# Patient Record
Sex: FEMALE | Race: WHITE | URBAN - METROPOLITAN AREA
[De-identification: names, ages, dates, MRNs, and addresses within clinical notes are randomized per-mention and may not be internally consistent; named-entity substitution may affect disease eponyms.]

---

## 2022-01-24 ENCOUNTER — OFFICE VISIT (OUTPATIENT)
Dept: PRIMARY CARE CLINIC | Age: 27
End: 2022-01-24
Payer: COMMERCIAL

## 2022-01-24 VITALS
BODY MASS INDEX: 44.41 KG/M2 | DIASTOLIC BLOOD PRESSURE: 72 MMHG | SYSTOLIC BLOOD PRESSURE: 118 MMHG | TEMPERATURE: 97.7 F | OXYGEN SATURATION: 98 % | WEIGHT: 293 LBS | HEART RATE: 94 BPM | HEIGHT: 68 IN

## 2022-01-24 DIAGNOSIS — Z13.1 SCREENING FOR DIABETES MELLITUS: ICD-10-CM

## 2022-01-24 DIAGNOSIS — F90.2 ATTENTION DEFICIT HYPERACTIVITY DISORDER (ADHD), COMBINED TYPE: ICD-10-CM

## 2022-01-24 DIAGNOSIS — R73.03 PREDIABETES: ICD-10-CM

## 2022-01-24 DIAGNOSIS — F33.0 MILD EPISODE OF RECURRENT MAJOR DEPRESSIVE DISORDER (HCC): Primary | ICD-10-CM

## 2022-01-24 DIAGNOSIS — R23.3 PETECHIAL RASH: ICD-10-CM

## 2022-01-24 DIAGNOSIS — Z13.220 SCREENING FOR HYPERLIPIDEMIA: ICD-10-CM

## 2022-01-24 DIAGNOSIS — J45.20 MILD INTERMITTENT ASTHMA WITHOUT COMPLICATION: ICD-10-CM

## 2022-01-24 DIAGNOSIS — G47.30 SLEEP APNEA, UNSPECIFIED TYPE: ICD-10-CM

## 2022-01-24 DIAGNOSIS — E66.01 CLASS 3 SEVERE OBESITY DUE TO EXCESS CALORIES WITHOUT SERIOUS COMORBIDITY WITH BODY MASS INDEX (BMI) OF 45.0 TO 49.9 IN ADULT (HCC): ICD-10-CM

## 2022-01-24 PROCEDURE — 99204 OFFICE O/P NEW MOD 45 MIN: CPT | Performed by: FAMILY MEDICINE

## 2022-01-24 RX ORDER — BUPROPION HYDROCHLORIDE 150 MG/1
150 TABLET ORAL EVERY MORNING
Qty: 30 TABLET | Refills: 0 | Status: SHIPPED | OUTPATIENT
Start: 2022-01-24 | End: 2022-02-28 | Stop reason: SDUPTHER

## 2022-01-24 RX ORDER — ACETAMINOPHEN, ASPIRIN AND CAFFEINE 250; 250; 65 MG/1; MG/1; MG/1
1 TABLET, FILM COATED ORAL EVERY 6 HOURS PRN
COMMUNITY

## 2022-01-24 SDOH — ECONOMIC STABILITY: FOOD INSECURITY: WITHIN THE PAST 12 MONTHS, YOU WORRIED THAT YOUR FOOD WOULD RUN OUT BEFORE YOU GOT MONEY TO BUY MORE.: NEVER TRUE

## 2022-01-24 SDOH — ECONOMIC STABILITY: FOOD INSECURITY: WITHIN THE PAST 12 MONTHS, THE FOOD YOU BOUGHT JUST DIDN'T LAST AND YOU DIDN'T HAVE MONEY TO GET MORE.: NEVER TRUE

## 2022-01-24 ASSESSMENT — PATIENT HEALTH QUESTIONNAIRE - PHQ9
4. FEELING TIRED OR HAVING LITTLE ENERGY: 2
7. TROUBLE CONCENTRATING ON THINGS, SUCH AS READING THE NEWSPAPER OR WATCHING TELEVISION: 2
1. LITTLE INTEREST OR PLEASURE IN DOING THINGS: 1
SUM OF ALL RESPONSES TO PHQ QUESTIONS 1-9: 9
2. FEELING DOWN, DEPRESSED OR HOPELESS: 1
8. MOVING OR SPEAKING SO SLOWLY THAT OTHER PEOPLE COULD HAVE NOTICED. OR THE OPPOSITE, BEING SO FIGETY OR RESTLESS THAT YOU HAVE BEEN MOVING AROUND A LOT MORE THAN USUAL: 1
SUM OF ALL RESPONSES TO PHQ QUESTIONS 1-9: 9
10. IF YOU CHECKED OFF ANY PROBLEMS, HOW DIFFICULT HAVE THESE PROBLEMS MADE IT FOR YOU TO DO YOUR WORK, TAKE CARE OF THINGS AT HOME, OR GET ALONG WITH OTHER PEOPLE: 1
9. THOUGHTS THAT YOU WOULD BE BETTER OFF DEAD, OR OF HURTING YOURSELF: 0
3. TROUBLE FALLING OR STAYING ASLEEP: 1
SUM OF ALL RESPONSES TO PHQ QUESTIONS 1-9: 9
SUM OF ALL RESPONSES TO PHQ QUESTIONS 1-9: 9
6. FEELING BAD ABOUT YOURSELF - OR THAT YOU ARE A FAILURE OR HAVE LET YOURSELF OR YOUR FAMILY DOWN: 0
5. POOR APPETITE OR OVEREATING: 1
SUM OF ALL RESPONSES TO PHQ9 QUESTIONS 1 & 2: 2

## 2022-01-24 ASSESSMENT — ANXIETY QUESTIONNAIRES
5. BEING SO RESTLESS THAT IT IS HARD TO SIT STILL: 0
GAD7 TOTAL SCORE: 5
IF YOU CHECKED OFF ANY PROBLEMS ON THIS QUESTIONNAIRE, HOW DIFFICULT HAVE THESE PROBLEMS MADE IT FOR YOU TO DO YOUR WORK, TAKE CARE OF THINGS AT HOME, OR GET ALONG WITH OTHER PEOPLE: SOMEWHAT DIFFICULT
4. TROUBLE RELAXING: 0
3. WORRYING TOO MUCH ABOUT DIFFERENT THINGS: 1
2. NOT BEING ABLE TO STOP OR CONTROL WORRYING: 1
7. FEELING AFRAID AS IF SOMETHING AWFUL MIGHT HAPPEN: 2
1. FEELING NERVOUS, ANXIOUS, OR ON EDGE: 1
6. BECOMING EASILY ANNOYED OR IRRITABLE: 0

## 2022-01-24 ASSESSMENT — SOCIAL DETERMINANTS OF HEALTH (SDOH): HOW HARD IS IT FOR YOU TO PAY FOR THE VERY BASICS LIKE FOOD, HOUSING, MEDICAL CARE, AND HEATING?: NOT HARD AT ALL

## 2022-01-24 NOTE — PATIENT INSTRUCTIONS
[address] [facility] [place] [facility], & Zdouglas Etorbidea George, MD  [address], [address] [place] [address], [address] [place] [address]  [phone]

## 2022-01-24 NOTE — PROGRESS NOTES
PROGRESS NOTE     Huang Orosco MD  [address], [address], [address] [facility] [place] [address]         Phone: [phone]    Date of Service:  1/24/2022     Patient ID: Francesca Levi is a 32 y.o. female      Assessment / Plan:     1. Prediabetes  Checking the following. We will follow up on results and act accordingly  - Hemoglobin A1C; Future    2. Attention deficit hyperactivity disorder (ADHD), combined type  Discussed that stimulants would not be the correct medication choice for her since her school is at night. Discussed possibly using Wellbutrin off label to help with her mild depression, and see if this is the ADHD as well. She is open to this option. We will start. Reviewed side effects and expected course. 3. Mild intermittent asthma without complication  Well-controlled. Has not needed treatment quite times    4. Mild episode of recurrent major depressive disorder ([facility]  Not controlled. Discussed treatment options. Starting Wellbutrin 150 mg XR. Reviewed side effects and expected course. Pt expressed understanding  - buPROPion (WELLBUTRIN XL) 150 MG extended release tablet; Take 1 tablet by mouth every morning  Dispense: 30 tablet; Refill: 0    5. Sleep apnea, unspecified type  Referred to Dr. Rodolfo Tan for further evaluation and treatment. - Yeison Reyna MD, Sleep Medicine, [facility]    6. Class 3 severe obesity due to excess calories without serious comorbidity with body mass index (BMI) of 45.0 to 49.9 in [facility])  Spent some time discussing numbers between fad dieting and lifestyle change. Discussed the importance of portion control and moderation. Patient is going to start working on these items. Checking the following  - Comprehensive Metabolic Panel; Future    9. Petechial rash  Unclear etiology.   Strange that she has had this rash for over 4 years without a diagnosis. Even though she had blood work in the past, will repeat the following. We will schedule patient for skin biopsy procedure soon. Possible vasculitis of some type  - CBC Auto Differential; Future  - APTT; Future  - PROTIME-INR; Future      Pt to f/u with myself for physical in one month as well. Will re-eval depression, concentration and weight loss journey at that time as well    HM    Declined hep C and HIV scfren    tdap deferred by pt    Declining flu shot.;    - Lipid Panel; Future    Subjective:     CC: prediabetes, ADHD, petechial rash, mild asthma, mild depression, PAULA, obesity    HPI    80-year-old white female here to establish care and receive treatment of the above. Patient is not happy with her weight gain over the last 2 years. She states she is about 60 pounds in the last year. She knows she was prediabetic in the past, and would like to be tested for this again. She was successful of low-carb diet in the past.  She currently eats 3 meals a day with 2 snacks in between. 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 She would like advice on how to lose weight. Additionally, patient is going to school at night from 7 PM to 10 PM work all day from 8 AM until 5 PM.  She would like to restart ADHD medication for school. She is currently planning on getting her associates degree in IT. She was on Concerta and Ritalin in the past.  She was diagnosed as a child and have been on and off stimulants over the last 5 years when needed.   She states she has the following:  fails to give close attention to details or makes careless mistakes in school, work, or other activities, has difficulty sustaining attention in tasks or play activities, has difficulty organizing tasks and activities, does not follow through on instructions and fails to finish schoolwork, chores, or duties in the workplace, loses things that are necessary for tasks and activities, is often forgetful in daily activities and avoids engaging in tasks that require sustained attention. Patient has a history of depression. She states she currently has mild symptoms but is doing better than she used to. She practices daily morning affirmations, and feels that her symptoms are slowly improving also weight gain does impact her mood a lot. She also often feels overwhelmed at work and feels like she has to be perfect at work. She has intermittent depressed mood and anhedonia, a few days a week. She denies feelings of hopelessness or worthlessness, suicidal homicidal ideation. PHQ 9 was benign today. Patient states she is also had a intermittent rash on and off for 4 years. She said she had blood work in the past that was normal for the rash, and states she was told there were petechiae. The rash comes and goes on her arms and legs, but is always constant on her feet. She denies any chronic NSAID or aspirin use, but take excedrin migraine rarely    Patient states her boyfriend has been telling her that she does not breathe for 30 to 40 seconds in her sleep often. She believes that she snores as well. She does feel tired throughout the day    Patient says she had mild asthma. It usually only bothers her when she goes out into the cold. She will cough intermittently sometimes. She denies symptoms any other time. No nighttime symptoms.   She has not had an exacerbation or ever needed to go to the ER or be hospitalized    ROS:    Constitutional:  Negative for activity or appetite change, fever or fatigue  HENT:  Negative for congestion, sinus pressure, or rhinorrhea  Eyes:  Negative for eye pain or visual changes  Resp:  Negative for SOB, chest tightness, cough  Cardiovascular: Negative for CP, palpitations, LACEY, orthopnea, PND, LE edema  Gastrointestinal: Negative for abd pain, melena, BRBPR, N/V/D  Endocrine:  Negative for polydipsia and polyuria  :  Negative for dysuria, flank pain or urinary frequency  Musculoskeletal:  Negative for back pain or myalgias  Neuro:  Negative for dizziness or lightheadedness        Vitals:    01/24/22 1520   BP: 118/72   Pulse: 94   Temp: 97.7 °F (36.5 °C)   TempSrc: Temporal   SpO2: 98%   Weight: (!) 317 lb (143.8 kg)   Height: 5' 7.5\" (1.715 m)       Outpatient Medications Marked as Taking for the 1/24/22 encounter (Office Visit) with Rocael Smith MD   Medication Sig Dispense Refill    aspirin-acetaminophen-caffeine (EXCEDRIN MIGRAINE) 630-859-44 MG per tablet Take 1 tablet by mouth every 6 hours as needed      buPROPion (WELLBUTRIN XL) 150 MG extended release tablet Take 1 tablet by mouth every morning 30 tablet 0             Objective:   Constitutional:   · Reviewed vitals above  · Well Nourished, well developed, no distress       HENT:  · Normal external nose without lesions  · Bilateral TMs translucent with normal light reflex and bony landmarks  · Normal oropharynx without erythema or exudate  · Normal nasal mucosa without swelling or erythema  · Mallampati score 4  Neck:  · Symmetric and without masses  · No thyromegaly  Resp:  · Normal effort  · Clear to auscultation bilaterally without rhonchi, wheezing or crackles  Cardiovascular:  · On auscultation, normal S1 and S2 without murmurs, rubs or gallops  · No bruits of bilateral carotids and no JVD  Gastrointestinal:  · Nontender, nondistended, and no masses  · No hepatosplenomegaly  Musculoskeletal:  · Normal Gait  · All extremities without clubbing, cyanosis or edema  Skin:  · All pinpoint petechiae on arms bilaterally, which much more noticeable and slightly larger petechiae on feet bilaterally.   Nonblanching  · No areas of increased heat or induration on palpation  Psych:  · Normal mood and affect  · Normal insight and judgement        EMR Dragon/transcription disclaimer:  Much of this encounter note is electronic transcription/translation of spoken language to printed texts.  The electronic translation of spoken language may be erroneous, or at times, nonsensical words or phrases may be inadvertently transcribed.   Although I have reviewed the note for such errors, some may still exist.

## 2022-02-09 PROBLEM — R23.3 PETECHIAL RASH: Status: ACTIVE | Noted: 2022-02-09

## 2022-02-10 ENCOUNTER — PROCEDURE VISIT (OUTPATIENT)
Dept: PRIMARY CARE CLINIC | Age: 27
End: 2022-02-10
Payer: COMMERCIAL

## 2022-02-10 VITALS
SYSTOLIC BLOOD PRESSURE: 124 MMHG | BODY MASS INDEX: 44.41 KG/M2 | HEIGHT: 68 IN | DIASTOLIC BLOOD PRESSURE: 85 MMHG | WEIGHT: 293 LBS | TEMPERATURE: 97.7 F | OXYGEN SATURATION: 98 % | HEART RATE: 87 BPM

## 2022-02-10 DIAGNOSIS — R23.3 PETECHIAL RASH: Primary | ICD-10-CM

## 2022-02-10 PROCEDURE — 11104 PUNCH BX SKIN SINGLE LESION: CPT | Performed by: FAMILY MEDICINE

## 2022-02-10 ASSESSMENT — PATIENT HEALTH QUESTIONNAIRE - PHQ9
6. FEELING BAD ABOUT YOURSELF - OR THAT YOU ARE A FAILURE OR HAVE LET YOURSELF OR YOUR FAMILY DOWN: 1
10. IF YOU CHECKED OFF ANY PROBLEMS, HOW DIFFICULT HAVE THESE PROBLEMS MADE IT FOR YOU TO DO YOUR WORK, TAKE CARE OF THINGS AT HOME, OR GET ALONG WITH OTHER PEOPLE: 1
5. POOR APPETITE OR OVEREATING: 0
SUM OF ALL RESPONSES TO PHQ QUESTIONS 1-9: 7
1. LITTLE INTEREST OR PLEASURE IN DOING THINGS: 1
9. THOUGHTS THAT YOU WOULD BE BETTER OFF DEAD, OR OF HURTING YOURSELF: 0
7. TROUBLE CONCENTRATING ON THINGS, SUCH AS READING THE NEWSPAPER OR WATCHING TELEVISION: 0
2. FEELING DOWN, DEPRESSED OR HOPELESS: 2
3. TROUBLE FALLING OR STAYING ASLEEP: 2
8. MOVING OR SPEAKING SO SLOWLY THAT OTHER PEOPLE COULD HAVE NOTICED. OR THE OPPOSITE, BEING SO FIGETY OR RESTLESS THAT YOU HAVE BEEN MOVING AROUND A LOT MORE THAN USUAL: 0
SUM OF ALL RESPONSES TO PHQ QUESTIONS 1-9: 7
SUM OF ALL RESPONSES TO PHQ QUESTIONS 1-9: 7
4. FEELING TIRED OR HAVING LITTLE ENERGY: 1
SUM OF ALL RESPONSES TO PHQ QUESTIONS 1-9: 7
SUM OF ALL RESPONSES TO PHQ9 QUESTIONS 1 & 2: 3

## 2022-02-10 NOTE — PROGRESS NOTES
PROCEDURE NOTE    PRE-OP DIAGNOSIS:  chronic petechial rash    PROCEDURE:  Skin Lesion Excision(s)    INDICATIONS:  Sara Chin is a 32 y.o. female who presents for skin biopsy. The patient understands all risks, benefits, indications, potential complications, and alternatives, and freely consents for the procedure. The patient also understands the option of not performing biopsy, the risk for scarring, and the technique of the procedure. HX: Sara Chin first noticed rash over 4 yrs ago, without a diagnosis. Rash is intermittent. Occurred first on her R foot, then progressed throughout body. Less common on upper chest. Occasionally feels a burning sensation associated with the rash. Pt reports previous blood work was normal and was told she had petechiae. Rash comes and goes on arms and legs, but is always constant on her feet. Denies chronic NSAID or aspirin use, takes Excedrin migraine rarely. ANESTHESIA:  Local.    TECHNIQUE:  After informed consent was obtained, and after the skin was prepped and draped, 1% lidocaine with epinephrine for anesthetic was injected around and underneath the site. 4mm punch biopsy was performed. A dressing was applied and wound care instructions were provided. Edwina tolerated the procedure well and without complications. The patient will be alert for any signs of cutaneous infection and will follow up as instructed.     Edwar Napoles DO  2/10/2022

## 2022-02-24 ENCOUNTER — TELEPHONE (OUTPATIENT)
Dept: PRIMARY CARE CLINIC | Age: 27
End: 2022-02-24

## 2022-02-24 DIAGNOSIS — M31.0 LEUKOCYTOCLASTIC VASCULITIS (HCC): Primary | ICD-10-CM

## 2022-02-24 NOTE — TELEPHONE ENCOUNTER
Has been taking excedrin for last 2 years, but petechial rash has been going on for 3-4 years. Long differential:  50% idiopathic, med induced, rhematologic (lupus, sjorgrens etc), infectious (viral hepatitis, HIV, syphyllis, s/p strept) ,  rare cases cancers (lymphomas, leukemias, visceral tumors, lung cancers)    No systemic symptoms suggesting idiopathic case. Reviewed ROS in great detail. Ordering additional blood work. Pt instructed to get blood work that I ordered prior along with blood work ordered from today      Will discuss treatment at f/u visit next week.

## 2022-02-26 ENCOUNTER — HOSPITAL ENCOUNTER (OUTPATIENT)
Age: 27
Discharge: HOME | End: 2022-02-26
Payer: COMMERCIAL

## 2022-02-26 DIAGNOSIS — Z13.220 SCREENING FOR HYPERLIPIDEMIA: ICD-10-CM

## 2022-02-26 DIAGNOSIS — M31.0 LEUKOCYTOCLASTIC VASCULITIS (HCC): ICD-10-CM

## 2022-02-26 DIAGNOSIS — E66.01 CLASS 3 SEVERE OBESITY DUE TO EXCESS CALORIES WITHOUT SERIOUS COMORBIDITY WITH BODY MASS INDEX (BMI) OF 45.0 TO 49.9 IN ADULT (HCC): ICD-10-CM

## 2022-02-26 DIAGNOSIS — R73.03 PREDIABETES: ICD-10-CM

## 2022-02-26 DIAGNOSIS — R23.3 PETECHIAL RASH: ICD-10-CM

## 2022-02-26 LAB
A/G RATIO: 1.6 (ref 1.1–2.2)
ALBUMIN SERPL-MCNC: 4.5 G/DL (ref 3.4–5)
ALP BLD-CCNC: 107 U/L (ref 40–129)
ALT SERPL-CCNC: 16 U/L (ref 10–40)
ANION GAP SERPL CALCULATED.3IONS-SCNC: 14 MMOL/L (ref 3–16)
APTT: 38.9 SEC (ref 26.2–38.6)
AST SERPL-CCNC: 16 U/L (ref 15–37)
BASOPHILS ABSOLUTE: 0.1 K/UL (ref 0–0.2)
BASOPHILS RELATIVE PERCENT: 1 %
BILIRUB SERPL-MCNC: 0.7 MG/DL (ref 0–1)
BILIRUBIN URINE: NEGATIVE
BLOOD, URINE: ABNORMAL
BUN BLDV-MCNC: 8 MG/DL (ref 7–20)
C-REACTIVE PROTEIN: 14.6 MG/L (ref 0–5.1)
C3 COMPLEMENT: 170.4 MG/DL (ref 90–180)
C4 COMPLEMENT: 35.5 MG/DL (ref 10–40)
CALCIUM SERPL-MCNC: 9.5 MG/DL (ref 8.3–10.6)
CHLORIDE BLD-SCNC: 102 MMOL/L (ref 99–110)
CHOLESTEROL, TOTAL: 160 MG/DL (ref 0–199)
CLARITY: CLEAR
CO2: 21 MMOL/L (ref 21–32)
COLOR: YELLOW
CREAT SERPL-MCNC: 0.6 MG/DL (ref 0.6–1.1)
EOSINOPHILS ABSOLUTE: 0.1 K/UL (ref 0–0.6)
EOSINOPHILS RELATIVE PERCENT: 0.9 %
EPITHELIAL CELLS, UA: ABNORMAL /HPF (ref 0–5)
GFR AFRICAN AMERICAN: >60
GFR NON-AFRICAN AMERICAN: >60
GLUCOSE BLD-MCNC: 83 MG/DL (ref 70–99)
GLUCOSE URINE: NEGATIVE MG/DL
HBV SURFACE AB TITR SER: 27.45 MIU/ML
HCT VFR BLD CALC: 41.4 % (ref 36–48)
HDLC SERPL-MCNC: 44 MG/DL (ref 40–60)
HEMOGLOBIN: 13.7 G/DL (ref 12–16)
HEPATITIS B CORE IGM ANTIBODY: NORMAL
HEPATITIS B SURFACE ANTIGEN INTERPRETATION: NORMAL
HEPATITIS C ANTIBODY INTERPRETATION: NORMAL
INR BLD: 1.08 (ref 0.88–1.12)
KETONES, URINE: NEGATIVE MG/DL
LDL CHOLESTEROL CALCULATED: 93 MG/DL
LEUKOCYTE ESTERASE, URINE: NEGATIVE
LYMPHOCYTES ABSOLUTE: 2.3 K/UL (ref 1–5.1)
LYMPHOCYTES RELATIVE PERCENT: 23 %
MCH RBC QN AUTO: 26 PG (ref 26–34)
MCHC RBC AUTO-ENTMCNC: 33 G/DL (ref 31–36)
MCV RBC AUTO: 78.8 FL (ref 80–100)
MICROSCOPIC EXAMINATION: YES
MONOCYTES ABSOLUTE: 0.5 K/UL (ref 0–1.3)
MONOCYTES RELATIVE PERCENT: 4.9 %
NEUTROPHILS ABSOLUTE: 6.9 K/UL (ref 1.7–7.7)
NEUTROPHILS RELATIVE PERCENT: 70.2 %
NITRITE, URINE: NEGATIVE
PDW BLD-RTO: 13.4 % (ref 12.4–15.4)
PH UA: 5.5 (ref 5–8)
PLATELET # BLD: 265 K/UL (ref 135–450)
PMV BLD AUTO: 8.7 FL (ref 5–10.5)
POTASSIUM SERPL-SCNC: 4.2 MMOL/L (ref 3.5–5.1)
PROTEIN UA: NEGATIVE MG/DL
PROTHROMBIN TIME: 12.2 SEC (ref 9.9–12.7)
RBC # BLD: 5.25 M/UL (ref 4–5.2)
RBC UA: ABNORMAL /HPF (ref 0–4)
RHEUMATOID FACTOR: <10 IU/ML
SODIUM BLD-SCNC: 137 MMOL/L (ref 136–145)
SPECIFIC GRAVITY UA: 1.02 (ref 1–1.03)
TOTAL PROTEIN: 7.3 G/DL (ref 6.4–8.2)
TRIGL SERPL-MCNC: 116 MG/DL (ref 0–150)
URINE TYPE: ABNORMAL
UROBILINOGEN, URINE: 0.2 E.U./DL
VLDLC SERPL CALC-MCNC: 23 MG/DL
WBC # BLD: 9.8 K/UL (ref 4–11)
WBC UA: ABNORMAL /HPF (ref 0–5)

## 2022-02-26 PROCEDURE — 83036 HEMOGLOBIN GLYCOSYLATED A1C: CPT

## 2022-02-26 PROCEDURE — 82595 ASSAY OF CRYOGLOBULIN: CPT

## 2022-02-26 PROCEDURE — 86431 RHEUMATOID FACTOR QUANT: CPT

## 2022-02-26 PROCEDURE — 87390 HIV-1 AG IA: CPT

## 2022-02-26 PROCEDURE — 86702 HIV-2 ANTIBODY: CPT

## 2022-02-26 PROCEDURE — 86160 COMPLEMENT ANTIGEN: CPT

## 2022-02-26 PROCEDURE — 87340 HEPATITIS B SURFACE AG IA: CPT

## 2022-02-26 PROCEDURE — 86780 TREPONEMA PALLIDUM: CPT

## 2022-02-26 PROCEDURE — 84165 PROTEIN E-PHORESIS SERUM: CPT

## 2022-02-26 PROCEDURE — 86706 HEP B SURFACE ANTIBODY: CPT

## 2022-02-26 PROCEDURE — 86701 HIV-1ANTIBODY: CPT

## 2022-02-26 PROCEDURE — 80053 COMPREHEN METABOLIC PANEL: CPT

## 2022-02-26 PROCEDURE — 85730 THROMBOPLASTIN TIME PARTIAL: CPT

## 2022-02-26 PROCEDURE — 85610 PROTHROMBIN TIME: CPT

## 2022-02-26 PROCEDURE — 84155 ASSAY OF PROTEIN SERUM: CPT

## 2022-02-26 PROCEDURE — 36415 COLL VENOUS BLD VENIPUNCTURE: CPT

## 2022-02-26 PROCEDURE — 86705 HEP B CORE ANTIBODY IGM: CPT

## 2022-02-26 PROCEDURE — 85025 COMPLETE CBC W/AUTO DIFF WBC: CPT

## 2022-02-26 PROCEDURE — 86140 C-REACTIVE PROTEIN: CPT

## 2022-02-26 PROCEDURE — 86803 HEPATITIS C AB TEST: CPT

## 2022-02-26 PROCEDURE — 86334 IMMUNOFIX E-PHORESIS SERUM: CPT

## 2022-02-26 PROCEDURE — 86200 CCP ANTIBODY: CPT

## 2022-02-26 PROCEDURE — 81001 URINALYSIS AUTO W/SCOPE: CPT

## 2022-02-26 PROCEDURE — 86038 ANTINUCLEAR ANTIBODIES: CPT

## 2022-02-26 PROCEDURE — 80061 LIPID PANEL: CPT

## 2022-02-27 LAB
ESTIMATED AVERAGE GLUCOSE: 99.7 MG/DL
HBA1C MFR BLD: 5.1 %

## 2022-02-28 ENCOUNTER — TELEPHONE (OUTPATIENT)
Dept: PRIMARY CARE CLINIC | Age: 27
End: 2022-02-28

## 2022-02-28 DIAGNOSIS — F33.0 MILD EPISODE OF RECURRENT MAJOR DEPRESSIVE DISORDER (HCC): ICD-10-CM

## 2022-02-28 PROBLEM — R23.3 PETECHIAL RASH: Status: RESOLVED | Noted: 2022-02-09 | Resolved: 2022-02-28

## 2022-02-28 PROBLEM — M31.0 LEUKOCYTOCLASTIC VASCULITIS (HCC): Status: ACTIVE | Noted: 2022-02-28

## 2022-02-28 LAB
ALBUMIN SERPL-MCNC: 3.4 G/DL (ref 3.1–4.9)
ALPHA-1-GLOBULIN: 0.3 G/DL (ref 0.2–0.4)
ALPHA-2-GLOBULIN: 1 G/DL (ref 0.4–1.1)
ANTI-NUCLEAR ANTIBODY (ANA): NEGATIVE
BETA GLOBULIN: 1.3 G/DL (ref 0.9–1.6)
CYCLIC CITRULLINATED PEPTIDE ANTIBODY IGG: 1.1 U/ML (ref 0–2.9)
GAMMA GLOBULIN: 1.3 G/DL (ref 0.6–1.8)
HIV AG/AB: NORMAL
HIV ANTIGEN: NORMAL
HIV-1 ANTIBODY: NORMAL
HIV-2 AB: NORMAL
TOTAL PROTEIN: 7.2 G/DL (ref 6.4–8.2)
TOTAL SYPHILLIS IGG/IGM: NORMAL

## 2022-02-28 RX ORDER — BUPROPION HYDROCHLORIDE 150 MG/1
150 TABLET ORAL EVERY MORNING
Qty: 30 TABLET | Refills: 0 | Status: SHIPPED | OUTPATIENT
Start: 2022-02-28 | End: 2022-03-14 | Stop reason: SDUPTHER

## 2022-02-28 NOTE — TELEPHONE ENCOUNTER
buPROPion (WELLBUTRIN XL) 150 MG extended release tablet      PT needs refill. Had to reschedule due to being sick.     Next OV: 3/14

## 2022-02-28 NOTE — PROGRESS NOTES
PROGRESS NOTE     Shanell Caraballo MD  [address], [address], [address] [facility] [place] [address]         Phone: [phone]    Date of Service:  3/14/2022     Patient ID: Francesca Duron is a 32 y.o. female      Assessment / Plan:     1. Attention deficit hyperactivity disorder (ADHD), combined type  Much improved with wellbutrin 150mg XL. Pt to continue. Pt opting not to increase dosage at this time    Encouraged pt to still schedule with Dr Chica Ray for PAULA evaluation    2. Mild episode of recurrent major depressive disorder ([facility]  Much improved with wellbutrin 150mg XL. Pt to continue    3. Leukocytoclastic vasculitis (HCC)  Long differential:  50% idiopathic, med induced, rhematologic (lupus, sjorgrens etc), infectious (viral hepatitis, HIV, syphyllis, s/p strept) ,  rare cases cancers (lymphomas, leukemias, visceral tumors, lung cancers)     No systemic symptoms suggesting idiopathic case and extensive blood work was normal    Starting high dose prednisone, 60mg daily for 2 weeks, followed by slow taper    Referring to derm for further eval.    Pt to f/u with me in 6 weeks to see if prednisone is helping. May need colchicine or dapsone afterwards if rash returns after steriod. HM  COVID booster encouraged to get    tdap deferred by pt     Declining flu shot    Pap smear : plans on scheduling with ob/gyn    Subjective:     CC:   ADHD, mild episode MDD, leukocytoclastic vasculitis. HPI    79-year-old white female here for review of the above. Patient was initially seen about 2 weeks ago, presenting with ADHD symptoms and mild depression at the time. On exam she was also found to have a petechial rash over most of her body, especially on extremities. Patient was working during the day, and going to night school at night.   Discussed that stimulants would not be a good option for her since school is at night. We instead started Wellbutrin 150 mg XL daily. Patient expressed this is helped the ADHD symptoms during schoolwork dramatically, and is also helped some of her mild symptoms. Depression symptoms seem to fluctuate depending on external stressors. She is working 50 to 60 hours a week between work and school, just picked out a wedding dress, and is getting  in December. Overall she is happy with control of both ADHD and MDD. Lastly, pt has had a petechial rash on arms, legs and trunk for 3-4 years. Started prior to her prn excedrin migraine use. Denies any meds or over the counters while starting on meds. ROS essentially negative, see below. Biopsy done on 2/10/22 at our office showed leukocytoclastic vasculitis. Blood work below was done looking for underlying causes.         Lab Review :      Hospital Outpatient Visit on 02/26/2022   Component Date Value    CC Peptide,IgG Ab 02/26/2022 1.1           Hep B Core Ab, IgM 02/26/2022 Non-reactive     Hep B S Ag Interp 02/26/2022 Non-reactive     Hep B S Ab 02/26/2022 27.45     Hep C Ab Interp 02/26/2022 Non-reactive     HIV Ag/Ab 02/26/2022 Non-Reactive     HIV-1 Antibody 02/26/2022 Non-Reactive     HIV ANTIGEN 02/26/2022 Non-Reactive     HIV-2 Ab 02/26/2022 Non-Reactive           Total Syphillis IgG/IgM 02/26/2022 Non-Reactive           C4 Complement 02/26/2022 35.5     C3 Complement 02/26/2022 170.4           Cryoglobulin, Qualitative 02/26/2022 NEG 72Hour           Color, UA 02/26/2022 Yellow     Clarity, UA 02/26/2022 Clear     Glucose, Ur 02/26/2022 Negative     Bilirubin Urine 02/26/2022 Negative     Ketones, Urine 02/26/2022 Negative     Specific Gravity, UA 02/26/2022 1.020     Blood, Urine 02/26/2022 LARGE*  Pt was on her menses    pH, UA 02/26/2022 5.5     Protein, UA 02/26/2022 Negative     Urobilinogen, Urine 02/26/2022 0.2     Nitrite, Urine 02/26/2022 Negative     Leukocyte Esterase, Urine 02/26/2022 Negative     Microscopic Examination 02/26/2022 YES     Urine Type 02/26/2022 NotGiven     WBC, UA 02/26/2022 0-2     RBC, UA 02/26/2022 3-4     Epithelial Cells, UA 02/26/2022 0-1           Rheumatoid Factor 02/26/2022 <10.0     DANIEL 02/26/2022 Negative     CRP 02/26/2022 14.6*          Protime 02/26/2022 12.2     INR 02/26/2022 1.08     aPTT 02/26/2022 38.9*          WBC 02/26/2022 9.8     RBC 02/26/2022 5.25*    Hemoglobin 02/26/2022 13.7     Hematocrit 02/26/2022 41.4     MCV 02/26/2022 78.8*    MCH 02/26/2022 26.0     MCHC 02/26/2022 33.0     RDW 02/26/2022 13.4     Platelets 82/12/3070 265     MPV 02/26/2022 8.7     Neutrophils % 02/26/2022 70.2     Lymphocytes % 02/26/2022 23.0     Monocytes % 02/26/2022 4.9     Eosinophils % 02/26/2022 0.9     Basophils % 02/26/2022 1.0     Neutrophils Absolute 02/26/2022 6.9     Lymphocytes Absolute 02/26/2022 2.3     Monocytes Absolute 02/26/2022 0.5     Eosinophils Absolute 02/26/2022 0.1     Basophils Absolute 02/26/2022 0.1           Hemoglobin A1C 02/26/2022 5.1     eAG 02/26/2022 99.7           Sodium 02/26/2022 137     Potassium 02/26/2022 4.2     Chloride 02/26/2022 102     CO2 02/26/2022 21     Anion Gap 02/26/2022 14     Glucose 02/26/2022 83     BUN 02/26/2022 8     CREATININE 02/26/2022 0.6     GFR Non- 02/26/2022 >60     GFR  02/26/2022 >60     Calcium 02/26/2022 9.5           Total Protein 02/26/2022 7.3     Albumin 02/26/2022 4.5     Albumin/Globulin Ratio 02/26/2022 1.6     Total Bilirubin 02/26/2022 0.7     Alkaline Phosphatase 02/26/2022 107     ALT 02/26/2022 16     AST 02/26/2022 16           Cholesterol, Total 02/26/2022 160     Triglycerides 02/26/2022 116     HDL 02/26/2022 44     LDL Calculated 02/26/2022 93     VLDL Cholesterol Calcula* 02/26/2022 23           Total Protein 02/26/2022 7.2     Albumin 02/26/2022 3.4     Alpha-1-Globulin 02/26/2022 0.3  Alpha-2-Globulin 02/26/2022 1.0     Beta Globulin 02/26/2022 1.3     Gamma Globulin 02/26/2022 1.3     SPE/KAYLEN Interpretation 02/26/2022 REVIEWED    ·  The serum protein electrophoresis is unremarkable. No monoclonal protein is seen on immunofixation.     ROS:    Constitutional:  Negative for activity or appetite change, fever or fatigue, weight loss, night sweats  HENT:  Negative for congestion, sinus pressure, or rhinorrhea  Eyes:  Negative for eye pain or visual changes  Resp:  Negative for SOB, chest tightness, cough  Cardiovascular: Negative for CP, palpitations, LACEY, orthopnea, PND, LE edema  Gastrointestinal: Negative for abd pain, melena, BRBPR, N/V/D  Endocrine:  Negative for polydipsia and polyuria  :  Negative for dysuria, flank pain or urinary frequency  Musculoskeletal:  Negative for back pain or myalgias, no arthralgias  Neuro:  Negative for dizziness or lightheadedness, peripheral neuropathy  Psych: negative for depression or anxiety      Vitals:    03/14/22 1603   BP: 124/82   Site: Left Upper Arm   Position: Sitting   Cuff Size: Large Adult   Pulse: 108   Temp: 98 °F (36.7 °C)   TempSrc: Temporal   SpO2: 98%   Weight: (!) 315 lb (142.9 kg)   Height: 5' 7.52\" (1.715 m)       Outpatient Medications Marked as Taking for the 3/14/22 encounter (Office Visit) with Ben Aguillon MD   Medication Sig Dispense Refill    albuterol sulfate  (90 Base) MCG/ACT inhaler TAKE 2 PUFFS BY INHALER UP TO 4 TIMES A DAY IF NEEDED FOR QUICK RELIEF ONLY (MAY TAKE 1 TO 2 PUFFS BEFORE EXERCISE)      buPROPion (WELLBUTRIN XL) 150 MG extended release tablet Take 1 tablet by mouth every morning 90 tablet 0    aspirin-acetaminophen-caffeine (EXCEDRIN MIGRAINE) 250-250-65 MG per tablet Take 1 tablet by mouth every 6 hours as needed               Objective:   Constitutional:   · Reviewed vitals above  · Well Nourished, well developed, no distress       Resp:  · Normal effort  · Clear to auscultation bilaterally without rhonchi, wheezing or crackles  Cardiovascular:  · On auscultation, normal S1 and S2 without murmurs, rubs or gallops  · No bruits of bilateral carotids and no JVD  Gastrointestinal:  · Nontender, nondistended, and no masses  · No hepatosplenomegaly  Musculoskeletal:  · Normal Gait  · All extremities without clubbing, cyanosis or edema  Skin:  · Petechial rash on arms and legs  · No areas of increased heat or induration on palpation  Psych:  · Normal mood and affect  · Normal insight and judgement        EMR Dragon/transcription disclaimer:  Much of this encounter note is electronic transcription/translation of spoken language to printed texts. The electronic translation of spoken language may be erroneous, or at times, nonsensical words or phrases may be inadvertently transcribed.   Although I have reviewed the note for such errors, some may still exist.

## 2022-03-02 LAB — SPE/IFE INTERPRETATION: NORMAL

## 2022-03-03 LAB — CRYOGLOBULIN, QUALITATIVE: NORMAL

## 2022-03-14 ENCOUNTER — OFFICE VISIT (OUTPATIENT)
Dept: PRIMARY CARE CLINIC | Age: 27
End: 2022-03-14
Payer: COMMERCIAL

## 2022-03-14 VITALS
OXYGEN SATURATION: 98 % | HEIGHT: 68 IN | BODY MASS INDEX: 44.41 KG/M2 | SYSTOLIC BLOOD PRESSURE: 124 MMHG | HEART RATE: 108 BPM | TEMPERATURE: 98 F | WEIGHT: 293 LBS | DIASTOLIC BLOOD PRESSURE: 82 MMHG

## 2022-03-14 DIAGNOSIS — M31.0 LEUKOCYTOCLASTIC VASCULITIS (HCC): ICD-10-CM

## 2022-03-14 DIAGNOSIS — F90.2 ATTENTION DEFICIT HYPERACTIVITY DISORDER (ADHD), COMBINED TYPE: Primary | ICD-10-CM

## 2022-03-14 DIAGNOSIS — F33.0 MILD EPISODE OF RECURRENT MAJOR DEPRESSIVE DISORDER (HCC): ICD-10-CM

## 2022-03-14 PROCEDURE — 99214 OFFICE O/P EST MOD 30 MIN: CPT | Performed by: FAMILY MEDICINE

## 2022-03-14 RX ORDER — PREDNISONE 20 MG/1
TABLET ORAL
Qty: 95 TABLET | Refills: 0 | Status: SHIPPED | OUTPATIENT
Start: 2022-03-14 | End: 2022-05-02

## 2022-03-14 RX ORDER — BUPROPION HYDROCHLORIDE 150 MG/1
150 TABLET ORAL EVERY MORNING
Qty: 90 TABLET | Refills: 0 | Status: SHIPPED | OUTPATIENT
Start: 2022-03-14

## 2022-03-14 RX ORDER — ALBUTEROL SULFATE 90 UG/1
AEROSOL, METERED RESPIRATORY (INHALATION)
COMMUNITY
Start: 2022-03-01

## 2022-03-14 ASSESSMENT — PATIENT HEALTH QUESTIONNAIRE - PHQ9
8. MOVING OR SPEAKING SO SLOWLY THAT OTHER PEOPLE COULD HAVE NOTICED. OR THE OPPOSITE, BEING SO FIGETY OR RESTLESS THAT YOU HAVE BEEN MOVING AROUND A LOT MORE THAN USUAL: 0
1. LITTLE INTEREST OR PLEASURE IN DOING THINGS: 1
2. FEELING DOWN, DEPRESSED OR HOPELESS: 2
3. TROUBLE FALLING OR STAYING ASLEEP: 1
9. THOUGHTS THAT YOU WOULD BE BETTER OFF DEAD, OR OF HURTING YOURSELF: 0
SUM OF ALL RESPONSES TO PHQ QUESTIONS 1-9: 6
6. FEELING BAD ABOUT YOURSELF - OR THAT YOU ARE A FAILURE OR HAVE LET YOURSELF OR YOUR FAMILY DOWN: 0
SUM OF ALL RESPONSES TO PHQ QUESTIONS 1-9: 6
5. POOR APPETITE OR OVEREATING: 0
SUM OF ALL RESPONSES TO PHQ9 QUESTIONS 1 & 2: 3
10. IF YOU CHECKED OFF ANY PROBLEMS, HOW DIFFICULT HAVE THESE PROBLEMS MADE IT FOR YOU TO DO YOUR WORK, TAKE CARE OF THINGS AT HOME, OR GET ALONG WITH OTHER PEOPLE: 1
4. FEELING TIRED OR HAVING LITTLE ENERGY: 0
7. TROUBLE CONCENTRATING ON THINGS, SUCH AS READING THE NEWSPAPER OR WATCHING TELEVISION: 2
SUM OF ALL RESPONSES TO PHQ QUESTIONS 1-9: 6
SUM OF ALL RESPONSES TO PHQ QUESTIONS 1-9: 6

## 2022-04-26 ENCOUNTER — OFFICE VISIT (OUTPATIENT)
Dept: PRIMARY CARE CLINIC | Age: 27
End: 2022-04-26
Payer: COMMERCIAL

## 2022-04-26 VITALS
OXYGEN SATURATION: 98 % | BODY MASS INDEX: 49.97 KG/M2 | SYSTOLIC BLOOD PRESSURE: 124 MMHG | TEMPERATURE: 98.7 F | WEIGHT: 293 LBS | DIASTOLIC BLOOD PRESSURE: 82 MMHG | HEART RATE: 89 BPM

## 2022-04-26 DIAGNOSIS — M31.0 LEUKOCYTOCLASTIC VASCULITIS (HCC): Primary | ICD-10-CM

## 2022-04-26 PROCEDURE — 99213 OFFICE O/P EST LOW 20 MIN: CPT | Performed by: FAMILY MEDICINE

## 2022-04-26 ASSESSMENT — PATIENT HEALTH QUESTIONNAIRE - PHQ9
9. THOUGHTS THAT YOU WOULD BE BETTER OFF DEAD, OR OF HURTING YOURSELF: 0
SUM OF ALL RESPONSES TO PHQ9 QUESTIONS 1 & 2: 2
SUM OF ALL RESPONSES TO PHQ QUESTIONS 1-9: 9
8. MOVING OR SPEAKING SO SLOWLY THAT OTHER PEOPLE COULD HAVE NOTICED. OR THE OPPOSITE, BEING SO FIGETY OR RESTLESS THAT YOU HAVE BEEN MOVING AROUND A LOT MORE THAN USUAL: 1
7. TROUBLE CONCENTRATING ON THINGS, SUCH AS READING THE NEWSPAPER OR WATCHING TELEVISION: 1
2. FEELING DOWN, DEPRESSED OR HOPELESS: 1
3. TROUBLE FALLING OR STAYING ASLEEP: 3
SUM OF ALL RESPONSES TO PHQ QUESTIONS 1-9: 9
1. LITTLE INTEREST OR PLEASURE IN DOING THINGS: 1
6. FEELING BAD ABOUT YOURSELF - OR THAT YOU ARE A FAILURE OR HAVE LET YOURSELF OR YOUR FAMILY DOWN: 0
10. IF YOU CHECKED OFF ANY PROBLEMS, HOW DIFFICULT HAVE THESE PROBLEMS MADE IT FOR YOU TO DO YOUR WORK, TAKE CARE OF THINGS AT HOME, OR GET ALONG WITH OTHER PEOPLE: 2
4. FEELING TIRED OR HAVING LITTLE ENERGY: 1
SUM OF ALL RESPONSES TO PHQ QUESTIONS 1-9: 9
SUM OF ALL RESPONSES TO PHQ QUESTIONS 1-9: 9
5. POOR APPETITE OR OVEREATING: 1

## 2022-04-26 ASSESSMENT — ANXIETY QUESTIONNAIRES
3. WORRYING TOO MUCH ABOUT DIFFERENT THINGS: 1
7. FEELING AFRAID AS IF SOMETHING AWFUL MIGHT HAPPEN: 0
2. NOT BEING ABLE TO STOP OR CONTROL WORRYING: 2
GAD7 TOTAL SCORE: 7
5. BEING SO RESTLESS THAT IT IS HARD TO SIT STILL: 0
4. TROUBLE RELAXING: 1
IF YOU CHECKED OFF ANY PROBLEMS ON THIS QUESTIONNAIRE, HOW DIFFICULT HAVE THESE PROBLEMS MADE IT FOR YOU TO DO YOUR WORK, TAKE CARE OF THINGS AT HOME, OR GET ALONG WITH OTHER PEOPLE: SOMEWHAT DIFFICULT
6. BECOMING EASILY ANNOYED OR IRRITABLE: 1
1. FEELING NERVOUS, ANXIOUS, OR ON EDGE: 2

## 2022-04-26 NOTE — PROGRESS NOTES
PROGRESS NOTE     Michael Guo MD  [address], [address] [facility] [address], [place] [address]         Phone: [phone]    Date of Service:  4/26/2022     Patient ID: Francesca Paredes is a 32 y.o. female      Assessment / Plan:      Leukocytoclastic vasculitis ([facility] [address]    Believe this is idiopathic as pt is asymptomatic besides petecihal rash and extensive laboratory w/u for the following differential is neg:  , rhematologic (lupus, sjorgrens etc), infectious (viral hepatitis, HIV, syphyllis, s/p strept) ,  rare cases cancers (lymphomas, leukemias, visceral tumors, lung cancers)    No meds that would lead to this when it started 4 years ago. wellbutrin is new med.     Wonderful improvement with high dose slow steriod taper, but now that finished steriods yesterday, already having mild recurrence of petechial rash on arms and legs. Pt may need non-steriod immune modulators. Referring to Rheum for evaluation and treatment:  Benitez Starks MD  [address] [place] [address]  Ph: [phone]        COVID booster encouraged to get    tdap deferred by pt     Declining flu shot    Pap smear : plans on scheduling with ob/gyn    Subjective:     CC:   ADHD, mild episode MDD, leukocytoclastic vasculitis. HPI      70-year-old white female here for f/u of leukocytoclastic vasculitis  Pt has had a petechial rash on arms, legs and trunk for 3-4 years. Started prior to her prn excedrin migraine use. Denies any meds or over the counters while starting on meds. ROS essentially negative, see below. Biopsy done on 2/10/22 at our office showed leukocytoclastic vasculitis. Blood work below was done looking for underlying causes. On 3/14/22 pt was started on high dose prednisone 60mg with slow taper. She finshed last dose yesterday.   She states petechial rash resolved completely while on steriods, but has already started to faintly re-appear on arms and legs in the last 24 hours. She states the prednisone did cause increase hunger, some water retention, and she gained 10 lbs in last 6 weeks.     Rest of ROS is still neg, see below    Lab Review :      Hospital Outpatient Visit on 02/26/2022   Component Date Value    CC Peptide,IgG Ab 02/26/2022 1.1           Hep B Core Ab, IgM 02/26/2022 Non-reactive     Hep B S Ag Interp 02/26/2022 Non-reactive     Hep B S Ab 02/26/2022 27.45     Hep C Ab Interp 02/26/2022 Non-reactive     HIV Ag/Ab 02/26/2022 Non-Reactive     HIV-1 Antibody 02/26/2022 Non-Reactive     HIV ANTIGEN 02/26/2022 Non-Reactive     HIV-2 Ab 02/26/2022 Non-Reactive           Total Syphillis IgG/IgM 02/26/2022 Non-Reactive           C4 Complement 02/26/2022 35.5     C3 Complement 02/26/2022 170.4           Cryoglobulin, Qualitative 02/26/2022 NEG 72Hour           Color, UA 02/26/2022 Yellow     Clarity, UA 02/26/2022 Clear     Glucose, Ur 02/26/2022 Negative     Bilirubin Urine 02/26/2022 Negative     Ketones, Urine 02/26/2022 Negative     Specific Gravity, UA 02/26/2022 1.020     Blood, Urine 02/26/2022 LARGE*  Pt was on her menses    pH, UA 02/26/2022 5.5     Protein, UA 02/26/2022 Negative     Urobilinogen, Urine 02/26/2022 0.2     Nitrite, Urine 02/26/2022 Negative     Leukocyte Esterase, Urine 02/26/2022 Negative     Microscopic Examination 02/26/2022 YES     Urine Type 02/26/2022 NotGiven     WBC, UA 02/26/2022 0-2     RBC, UA 02/26/2022 3-4     Epithelial Cells, UA 02/26/2022 0-1           Rheumatoid Factor 02/26/2022 <10.0     DANIEL 02/26/2022 Negative     CRP 02/26/2022 14.6*          Protime 02/26/2022 12.2     INR 02/26/2022 1.08     aPTT 02/26/2022 38.9*          WBC 02/26/2022 9.8     RBC 02/26/2022 5.25*    Hemoglobin 02/26/2022 13.7     Hematocrit 02/26/2022 41.4     MCV 02/26/2022 78.8*    [facility] 02/26/2022 26.0     MCHC 02/26/2022 33.0     RDW 02/26/2022 13.4     Platelets 35/32/4483 265     MPV 02/26/2022 8.7     Neutrophils % 02/26/2022 70.2     Lymphocytes % 02/26/2022 23.0     Monocytes % 02/26/2022 4.9     Eosinophils % 02/26/2022 0.9     Basophils % 02/26/2022 1.0     Neutrophils Absolute 02/26/2022 6.9     Lymphocytes Absolute 02/26/2022 2.3     Monocytes Absolute 02/26/2022 0.5     Eosinophils Absolute 02/26/2022 0.1     Basophils Absolute 02/26/2022 0.1           Hemoglobin A1C 02/26/2022 5.1     eAG 02/26/2022 99.7           Sodium 02/26/2022 137     Potassium 02/26/2022 4.2     Chloride 02/26/2022 102     CO2 02/26/2022 21     Anion Gap 02/26/2022 14     Glucose 02/26/2022 83     BUN 02/26/2022 8     CREATININE 02/26/2022 0.6     GFR Non- 02/26/2022 >60     GFR  02/26/2022 >60     Calcium 02/26/2022 9.5           Total Protein 02/26/2022 7.3     Albumin 02/26/2022 4.5     Albumin/Globulin Ratio 02/26/2022 1.6     Total Bilirubin 02/26/2022 0.7     Alkaline Phosphatase 02/26/2022 107     ALT 02/26/2022 16     AST 02/26/2022 16           Cholesterol, Total 02/26/2022 160     Triglycerides 02/26/2022 116     HDL 02/26/2022 44     LDL Calculated 02/26/2022 93     VLDL Cholesterol Calcula* 02/26/2022 23           Total Protein 02/26/2022 7.2     Albumin 02/26/2022 3.4     Alpha-1-Globulin 02/26/2022 0.3     Alpha-2-Globulin 02/26/2022 1.0     Beta Globulin 02/26/2022 1.3     Gamma Globulin 02/26/2022 1.3     SPE/KAYLEN Interpretation 02/26/2022 REVIEWED    ·  The serum protein electrophoresis is unremarkable. No monoclonal protein is seen on immunofixation.     ROS:    Constitutional:  Negative for activity or appetite change, fever or fatigue, weight loss, night sweats  HENT:  Negative for congestion, sinus pressure, or rhinorrhea  Eyes:  Negative for eye pain or visual changes  Resp:  Negative for SOB, chest tightness, cough  Cardiovascular: Negative for CP, palpitations, LACEY, orthopnea, PND, LE edema  Gastrointestinal: Negative for abd pain, melena, BRBPR, N/V/D  Endocrine:  Negative for polydipsia and polyuria  :  Negative for dysuria, flank pain or urinary frequency  Musculoskeletal:  Negative for back pain or myalgias, no arthralgias  Neuro:  Negative for dizziness or lightheadedness, peripheral neuropathy  Psych: negative for depression or anxiety      Vitals:    04/26/22 1528   Pulse: 89   SpO2: 98%       Outpatient Medications Marked as Taking for the 4/26/22 encounter (Office Visit) with Ghulam Payne MD   Medication Sig Dispense Refill    albuterol sulfate  (90 Base) MCG/ACT inhaler TAKE 2 PUFFS BY INHALER UP TO 4 TIMES A DAY IF NEEDED FOR QUICK RELIEF ONLY (MAY TAKE 1 TO 2 PUFFS BEFORE EXERCISE)      predniSONE (DELTASONE) 20 MG tablet Take 3 tablets by mouth daily for 14 days, THEN 2.5 tablets daily for 7 days, THEN 2 tablets daily for 7 days, THEN 1.5 tablets daily for 7 days, THEN 1 tablet daily for 7 days, THEN 0.5 tablets daily for 7 days.  95 tablet 0    buPROPion (WELLBUTRIN XL) 150 MG extended release tablet Take 1 tablet by mouth every morning 90 tablet 0    aspirin-acetaminophen-caffeine (EXCEDRIN MIGRAINE) 250-250-65 MG per tablet Take 1 tablet by mouth every 6 hours as needed             Objective:   Constitutional:   · Reviewed vitals above  · Well Nourished, well developed, no distress       Resp:  · Normal effort  · Clear to auscultation bilaterally without rhonchi, wheezing or crackles  Cardiovascular:  · On auscultation, normal S1 and S2 without murmurs, rubs or gallops  · No bruits of bilateral carotids and no JVD  Gastrointestinal:  · Nontender, nondistended, and no masses  · No hepatosplenomegaly  Musculoskeletal:  · Normal Gait  · All extremities without clubbing, cyanosis or edema  Skin:  · Petechial rash on arms and legs, more faint and much less diffuse and severe from last appointment  · No areas of increased heat or induration on palpation  Psych:  · Normal mood and affect  · Normal insight and judgement        EMR Dragon/transcription disclaimer:  Much of this encounter note is electronic transcription/translation of spoken language to printed texts. The electronic translation of spoken language may be erroneous, or at times, nonsensical words or phrases may be inadvertently transcribed.   Although I have reviewed the note for such errors, some may still exist.

## 2022-04-27 ENCOUNTER — TELEPHONE (OUTPATIENT)
Dept: PRIMARY CARE CLINIC | Age: 27
End: 2022-04-27

## 2022-04-27 NOTE — TELEPHONE ENCOUNTER
Referring pt to rheum    92 Katharine Schneider MD  [address] [place] [address], [address] [facility]  Ph: [phone]      Pt given info and states she will call to schedule

## 2022-05-04 ENCOUNTER — OFFICE VISIT (OUTPATIENT)
Dept: SLEEP MEDICINE | Age: 27
End: 2022-05-04
Payer: COMMERCIAL

## 2022-05-04 VITALS
OXYGEN SATURATION: 97 % | RESPIRATION RATE: 17 BRPM | HEART RATE: 107 BPM | BODY MASS INDEX: 44.41 KG/M2 | SYSTOLIC BLOOD PRESSURE: 115 MMHG | WEIGHT: 293 LBS | HEIGHT: 68 IN | DIASTOLIC BLOOD PRESSURE: 76 MMHG

## 2022-05-04 DIAGNOSIS — Z72.821 POOR SLEEP HYGIENE: ICD-10-CM

## 2022-05-04 DIAGNOSIS — G47.30 OBSERVED SLEEP APNEA: ICD-10-CM

## 2022-05-04 DIAGNOSIS — R06.83 SNORING: Primary | ICD-10-CM

## 2022-05-04 DIAGNOSIS — F51.04 PSYCHOPHYSIOLOGICAL INSOMNIA: ICD-10-CM

## 2022-05-04 DIAGNOSIS — E66.01 MORBID OBESITY WITH BMI OF 50.0-59.9, ADULT (HCC): ICD-10-CM

## 2022-05-04 DIAGNOSIS — R53.83 OTHER FATIGUE: ICD-10-CM

## 2022-05-04 PROBLEM — G47.33 OSA (OBSTRUCTIVE SLEEP APNEA): Status: ACTIVE | Noted: 2022-05-04

## 2022-05-04 PROCEDURE — 99204 OFFICE O/P NEW MOD 45 MIN: CPT | Performed by: NURSE PRACTITIONER

## 2022-05-04 ASSESSMENT — SLEEP AND FATIGUE QUESTIONNAIRES
HOW LIKELY ARE YOU TO NOD OFF OR FALL ASLEEP WHILE LYING DOWN TO REST IN THE AFTERNOON WHEN CIRCUMSTANCES PERMIT: 2
HOW LIKELY ARE YOU TO NOD OFF OR FALL ASLEEP WHILE WATCHING TV: 1
HOW LIKELY ARE YOU TO NOD OFF OR FALL ASLEEP WHILE SITTING AND READING: 1
ESS TOTAL SCORE: 7
NECK CIRCUMFERENCE (INCHES): 15.5
HOW LIKELY ARE YOU TO NOD OFF OR FALL ASLEEP WHILE SITTING INACTIVE IN A PUBLIC PLACE: 0
HOW LIKELY ARE YOU TO NOD OFF OR FALL ASLEEP WHEN YOU ARE A PASSENGER IN A CAR FOR AN HOUR WITHOUT A BREAK: 3
HOW LIKELY ARE YOU TO NOD OFF OR FALL ASLEEP IN A CAR, WHILE STOPPED FOR A FEW MINUTES IN TRAFFIC: 0
HOW LIKELY ARE YOU TO NOD OFF OR FALL ASLEEP WHILE SITTING QUIETLY AFTER LUNCH WITHOUT ALCOHOL: 0
HOW LIKELY ARE YOU TO NOD OFF OR FALL ASLEEP WHILE SITTING AND TALKING TO SOMEONE: 0

## 2022-05-04 NOTE — PATIENT INSTRUCTIONS
Sleep Hygiene. .. Tips for better sleep. .. Avoid naps. This will ensure you are sleepy at bedtime. If you have to take a nap, sleep less than 1 hour, before 3 pm.  Sleep only when sleepy; this reduces the time you are awake in bed. Regular exercise is recommended to help you deepen your sleep, but not within 4-6 hours of your bedtime. Timing of exercise is important, aim to exercise early in the morning or early afternoon. A light snack may help you fall asleep. Warm milk and foods high in the amino acid tryptophan, such as bananas, may help you to sleep  Be sure to avoid heavy, spicy or sugary foods 4-6 hours before bedtime and avoid at snack time. Stay away from stimulants such as caffeine and nicotine for at least 4-6 hours before bed. Stimulants can interfere with your ability to fall asleep. Caffeine is found in tea, cola, coffee, cocoa and chocolate and is best avoided at bedtime. Nicotine is found in tobacco products. Avoid alcohol 4-6 hours before bedtime. Alcohol has an immediate sleep-inducing effect, after a few hours when alcohol levels fall there is a stimulant or wake-up effect and will cause fragmented sleep. Sleep rituals are important. Give your body clues it is time to slow down and sleep. Examples include; yoga, deep breathing, listen to relaxing music, a hot bath or a few minutes of reading. Have a fixed bedtime and awakening time, Even on weekends! You will feel better keeping a regular sleep cycle, even if you are retired or not working. Get into your favorite sleep position. If not asleep in 30 minutes, get up and do something boring until you feel sleepy. Remember not to expose yourself to bright lights such as TV, phone or tablet screens. Only use your bed for sleeping. Do not use your bed as an office, workroom or recreation room. Use comfortable bedding. Uncomfortable bedding can prevent good sleep. Ensure your bedroom is quiet and comfortable.  A cooler room along with enough blankets to stay warm is recommended. If your room is too noisy, try a white noise machine. If too bright, try black out shades or an eye mask. Dont take worries to bed. Leave worries about work, school etc. behind you when you go to bed. Some people find it helpful to assign a worry period in the evening or late afternoon to write down your worries and get them out of your system. Address to Sleep Center: [facility] [address] [place] [address], [address] [place] [address]            Phone: [phone] Fax: [phone]    If you should need to cancel or reschedule your appointment, please call the Sleep Center at [phone] as soon as possible. We ask that you please phone the [facility] Patient Pre-Services ([phone]) at least 3-5 days prior to your sleep study to pre-register. Failing to pre-register may ultimately cause your insurance to not pay for this procedure. Please refrain from or reduce the use of caffeine and/or alcohol prior to your sleep study and avoid napping the day of your study as these will affect the accuracy of your test results.

## 2022-05-04 NOTE — PROGRESS NOTES
Patient ID: David Tellez is a 32 y.o. female who is being seen today for   Chief Complaint   Patient presents with    New Patient     Sleep eval ref by Felicia Sosa     Referring: Dr. Patito Lewis    HPI:   David Tellez is a 32 y.o. female in office for sleep apnea evaluation. Patient reports snoring at night for the past 2 years. Worse in supine position. Has witnessed apnea. Wakes up at night choking and gasping for air. No restorative sleep. No dry mouth upon awakening. Fatigue and tiredness during the day. No history of sleep apnea. Bedtime 10-11 pm and rise time is 630 am. It takes few minutes to fall asleep. No TV in bedroom. No nocturia. Wakes up 2 times at night. It takes 60 minutes to fall back a sleep-listens to music. Takes occasional nap during the day (  minutes). No headache in am. No car wrecks or near wrecks because of the sleepiness. No nodding off while driving. Gained 50 pounds in the past 2 years. Some forgetfulness and decreased concentration states has ADHD. Drinks 24 oz caffinated beverages per day. No alcohol. No restless feelings in legs at night. No teeth grinding. No nightmares. No sleep walking. No night time panic attacks. No narcotics. No drug abuse. +history of depression. No history of anxiety. No history of atrial fibrillation. No history of DM. No history of HTN. No history of ischemic heart disease. No history of stroke. ESS is 7 . No smoking. No known FH for RLS or narcolepsy.  +FH PAULA -[facility]    Sleep Medicine 5/4/2022   Sitting and reading 1   Watching TV 1   Sitting, inactive in a public place (e.g. a theatre or a meeting) 0   As a passenger in a car for an hour without a break 3   Lying down to rest in the afternoon when circumstances permit 2   Sitting and talking to someone 0   Sitting quietly after a lunch without alcohol 0   In a car, while stopped for a few minutes in traffic 0   Total score 7   Neck circumference (Inches) 15.5       Past Medical History:  Past Medical History:   Diagnosis Date    ADHD (attention deficit hyperactivity disorder)     Asthma     Class 3 severe obesity due to excess calories without serious comorbidity with body mass index (BMI) of 45.0 to 49.9 in adult (HCC)     Leukocytoclastic vasculitis (HCC)     Prediabetes        Past Surgical History:    History reviewed. No pertinent surgical history. Allergies:  is allergic to penicillins. Social History:    TOBACCO:   reports that she has never smoked. She has never used smokeless tobacco.  ETOH:   reports current alcohol use.     Family History:       Problem Relation Age of Onset    Diabetes type 2  Father     Heart Disease Maternal Grandmother     Heart Disease Maternal Grandfather     Breast Cancer Paternal Grandmother 48    Diabetes Type 1  Paternal Grandfather     Colon Cancer Paternal Grandfather        Current Medications:    Current Outpatient Medications:     albuterol sulfate  (90 Base) MCG/ACT inhaler, TAKE 2 PUFFS BY INHALER UP TO 4 TIMES A DAY IF NEEDED FOR QUICK RELIEF ONLY (MAY TAKE 1 TO 2 PUFFS BEFORE EXERCISE), Disp: , Rfl:     buPROPion (WELLBUTRIN XL) 150 MG extended release tablet, Take 1 tablet by mouth every morning, Disp: 90 tablet, Rfl: 0    aspirin-acetaminophen-caffeine (EXCEDRIN MIGRAINE) 250-250-65 MG per tablet, Take 1 tablet by mouth every 6 hours as needed, Disp: , Rfl:       Review of Systems  Constitutional: Negative for fever  HENT: Negative for sore throat  Eyes: Negative for redness   Respiratory: Negative for dyspnea, cough  Cardiovascular: Negative for chest pain  Gastrointestinal: Negative for vomiting, diarrhea   Genitourinary: Negative for hematuria   Musculoskeletal: Negative for arthralgias   Skin: Negative for rash  Neurological: Negative for syncope  Hematological: Negative for adenopathy  Psychiatric/Behavorial: Negative for anxiety      Objective:   PHYSICAL EXAM:  /76   Pulse 107   Resp 17   Ht 5' 7.5\" (1.715 m)   Wt (!) 325 lb (147.4 kg)   SpO2 97% Comment: RA  BMI 50.15 kg/m²     Physical Exam  Gen: No acute distress. Obese. BMI of 50.15  Eyes: PERRL. No sclera icterus. No conjunctival injection. ENT: No discharge. Pharynx clear. Mallampati class III  Neck: Trachea midline. No obvious mass. Neck circumference 15.5\"  Resp: No accessory muscle use. Nocrackles. No wheezes. No rhonchi. CV: Regular rate. Regular rhythm. No murmur or rub. Skin: Warm and dry. M/S: No cyanosis. No obvious joint deformity. Neuro: Awake. Alert. Moves all four extremities. Psych: Oriented x 3. No anxiety. DATA:   6/28/2018 TSH 2.13  2/26/2022 Hgb 13.7      Assessment:       · Snoring  · Observed sleep apnea   · Fatigue  · Sleep maintenance insomnia-psychophysiological hyperarousal, poor sleep hygiene, possible PAULA likely contributing  · Obesity  · ADHD        Plan:      · HST to evaluate forsleep related breathing disorder. · Treatment options were discussed with patient if HST reveals PAULA, including CPAP/APAP therapy, oral appliances and upper airway surgery. · Trial auto CPAP 8-16 cm H2O if HST is positive for PAULA  · Sleep hygiene  · Recommend set bed/wake times, no naps in the daytime  · Avoidsedatives, alcohol and caffeinated drinks at bed time. · No driving motorized vehicles or operating heavy machinery while fatigue, drowsy or sleepy. · Weight loss is also recommended as a long-term intervention. · Complications of PAULA if not treated were discussed with patient patient to include systemic hypertension, pulmonary hypertension, cardiovascular morbidities, car accidents and all cause mortality. Discussed pathophysiology of PAULA with patient today.   Patient education handout provided regarding sleep tips

## 2022-05-04 NOTE — LETTER
[facility]  [address]  [address]  Phone: [phone]  Fax: [phone]           DIXIE Kilgore CNP      May 4, 2022     Patient: Mena White   MR Number: [id number]   YOB: 1995   Date of Visit: 5/4/2022       Dear Dr. Grazyna Phillips:    Thank you for referring Mena White to me for evaluation/treatment. Below are the relevant portions of my assessment and plan of care. Assessment:       · Snoring  · Observed sleep apnea   · Fatigue  · Sleep maintenance insomniapsychophysiological hyperarousal, poor sleep hygiene, possible PAULA likely contributing  · Obesity  · ADHD        Plan:      · HST to evaluate forsleep related breathing disorder. · Treatment options were discussed with patient if HST reveals PAULA, including CPAP/APAP therapy, oral appliances and upper airway surgery. · Trial auto CPAP 8-16 cm H2O if HST is positive for PAULA  · Sleep hygiene  · Recommend set bed/wake times, no naps in the daytime  · Avoidsedatives, alcohol and caffeinated drinks at bed time. · No driving motorized vehicles or operating heavy machinery while fatigue, drowsy or sleepy. · Weight loss is also recommended as a long-term intervention. · Complications of PAULA if not treated were discussed with patient patient to include systemic hypertension, pulmonary hypertension, cardiovascular morbidities, car accidents and all cause mortality. Discussed pathophysiology of PAULA with patient today. Patient education handout provided regarding sleep tips      If you have questions, please do not hesitate to call me. I look forward to following Edwina along with you.     Sincerely,        Lorretta Rumble, APRN - CNP    CC providers:    No Recipients

## 2022-06-02 ENCOUNTER — HOSPITAL ENCOUNTER (OUTPATIENT)
Dept: SLEEP CENTER | Age: 27
Discharge: HOME | End: 2022-06-02
Payer: COMMERCIAL

## 2022-06-02 DIAGNOSIS — Z72.821 POOR SLEEP HYGIENE: ICD-10-CM

## 2022-06-02 DIAGNOSIS — F51.04 PSYCHOPHYSIOLOGICAL INSOMNIA: ICD-10-CM

## 2022-06-02 DIAGNOSIS — R53.83 OTHER FATIGUE: ICD-10-CM

## 2022-06-02 DIAGNOSIS — E66.01 MORBID OBESITY WITH BMI OF 50.0-59.9, ADULT (HCC): ICD-10-CM

## 2022-06-02 DIAGNOSIS — G47.30 OBSERVED SLEEP APNEA: ICD-10-CM

## 2022-06-02 DIAGNOSIS — R06.83 SNORING: ICD-10-CM

## 2022-06-02 PROCEDURE — 95806 SLEEP STUDY UNATT&RESP EFFT: CPT

## 2022-06-03 PROCEDURE — 95806 SLEEP STUDY UNATT&RESP EFFT: CPT | Performed by: INTERNAL MEDICINE

## 2022-08-19 ENCOUNTER — TELEPHONE (OUTPATIENT)
Dept: PULMONOLOGY | Age: 27
End: 2022-08-19

## 2022-08-19 NOTE — TELEPHONE ENCOUNTER
Patient cancelled appointment on 08/24/2022 with Thi Menchaca CNP for 31-90day. Reason: Pt does not have the funding move forward with obtaining cpap    Patient did not reschedule appointment. Appointment rescheduled for n/a. Last OV 05/04/2022      Assessment:       Snoring  Observed sleep apnea   Fatigue  Sleep maintenance insomnia-psychophysiological hyperarousal, poor sleep hygiene, possible PAULA likely contributing  Obesity  ADHD         Plan:      HST to evaluate forsleep related breathing disorder. Treatment options were discussed with patient if HST reveals PAULA, including CPAP/APAP therapy, oral appliances and upper airway surgery. Trial auto CPAP 8-16 cm H2O if HST is positive for PAULA  Sleep hygiene  Recommend set bed/wake times, no naps in the daytime  Avoidsedatives, alcohol and caffeinated drinks at bed time. No driving motorized vehicles or operating heavy machinery while fatigue, drowsy or sleepy. Weight loss is also recommended as a long-term intervention. Complications of PAULA if not treated were discussed with patient patient to include systemic hypertension, pulmonary hypertension, cardiovascular morbidities, car accidents and all cause mortality. Discussed pathophysiology of PAULA with patient today.   Patient education handout provided regarding sleep tips

## 2022-08-23 ENCOUNTER — TELEPHONE (OUTPATIENT)
Dept: PULMONOLOGY | Age: 27
End: 2022-08-23

## 2022-08-23 NOTE — TELEPHONE ENCOUNTER
Patient cancelled appointment on 08/23/22 with Harley Martinez CNP for sleep study results/treatment options. Reason: [place] in [place]    Patient  did reschedule appointment. Appointment rescheduled for 9/13/22. Last OV     Assessment: [id number]        Snoring  Observed sleep apnea   Fatigue  Sleep maintenance insomnia-psychophysiological hyperarousal, poor sleep hygiene, possible PAULA likely contributing  Obesity  ADHD         Plan:      HST to evaluate forsleep related breathing disorder. Treatment options were discussed with patient if HST reveals PAULA, including CPAP/APAP therapy, oral appliances and upper airway surgery. Trial auto CPAP 8-16 cm H2O if HST is positive for PAULA  Sleep hygiene  Recommend set bed/wake times, no naps in the daytime  Avoidsedatives, alcohol and caffeinated drinks at bed time. No driving motorized vehicles or operating heavy machinery while fatigue, drowsy or sleepy. Weight loss is also recommended as a long-term intervention. Complications of PAULA if not treated were discussed with patient patient to include systemic hypertension, pulmonary hypertension, cardiovascular morbidities, car accidents and all cause mortality. Discussed pathophysiology of PAULA with patient today.   Patient education handout provided regarding sleep tips

## 2022-09-13 ENCOUNTER — TELEMEDICINE (OUTPATIENT)
Dept: SLEEP MEDICINE | Age: 27
End: 2022-09-13
Payer: COMMERCIAL

## 2022-09-13 ENCOUNTER — TELEPHONE (OUTPATIENT)
Dept: PULMONOLOGY | Age: 27
End: 2022-09-13

## 2022-09-13 DIAGNOSIS — G47.33 MILD OBSTRUCTIVE SLEEP APNEA: Primary | ICD-10-CM

## 2022-09-13 DIAGNOSIS — R51.9 MORNING HEADACHE: ICD-10-CM

## 2022-09-13 DIAGNOSIS — F51.04 PSYCHOPHYSIOLOGICAL INSOMNIA: ICD-10-CM

## 2022-09-13 DIAGNOSIS — E66.01 MORBID OBESITY WITH BMI OF 50.0-59.9, ADULT (HCC): ICD-10-CM

## 2022-09-13 DIAGNOSIS — R53.83 OTHER FATIGUE: ICD-10-CM

## 2022-09-13 DIAGNOSIS — Z71.89 CPAP USE COUNSELING: ICD-10-CM

## 2022-09-13 PROCEDURE — G8427 DOCREV CUR MEDS BY ELIG CLIN: HCPCS | Performed by: NURSE PRACTITIONER

## 2022-09-13 PROCEDURE — 99213 OFFICE O/P EST LOW 20 MIN: CPT | Performed by: NURSE PRACTITIONER

## 2022-09-13 ASSESSMENT — SLEEP AND FATIGUE QUESTIONNAIRES
HOW LIKELY ARE YOU TO NOD OFF OR FALL ASLEEP WHILE WATCHING TV: 0
HOW LIKELY ARE YOU TO NOD OFF OR FALL ASLEEP WHILE SITTING INACTIVE IN A PUBLIC PLACE: 0
HOW LIKELY ARE YOU TO NOD OFF OR FALL ASLEEP WHILE LYING DOWN TO REST IN THE AFTERNOON WHEN CIRCUMSTANCES PERMIT: 1
HOW LIKELY ARE YOU TO NOD OFF OR FALL ASLEEP IN A CAR, WHILE STOPPED FOR A FEW MINUTES IN TRAFFIC: 0
HOW LIKELY ARE YOU TO NOD OFF OR FALL ASLEEP WHILE SITTING QUIETLY AFTER LUNCH WITHOUT ALCOHOL: 0
HOW LIKELY ARE YOU TO NOD OFF OR FALL ASLEEP WHILE SITTING AND READING: 0
HOW LIKELY ARE YOU TO NOD OFF OR FALL ASLEEP WHEN YOU ARE A PASSENGER IN A CAR FOR AN HOUR WITHOUT A BREAK: 0
HOW LIKELY ARE YOU TO NOD OFF OR FALL ASLEEP WHILE SITTING AND TALKING TO SOMEONE: 0
ESS TOTAL SCORE: 1

## 2022-09-13 NOTE — LETTER
[facility] Pulmonary, Critical Care, and Sleep  [address] [facility] [address] [address]  Phone: [phone]  Fax: [phone]      DIXIE Enamorado CNP      October 4, 2022    Lalitha Whitten  [address] [facility] [address]      Dear Ren Owusu:    I am writing because my staff has left multiple messages asking that you call the office to discuss aspects of your treatment, but to date they have not heard from you. As discussed at prior visits, complications of untreated sleep apnea can include:  excessive daytime sleepiness, systemic hypertension, pulmonary hypertension cardiac arrhythmias such as atrial fibrillation, stroke, and increased all - cause mortality. I hope you are doing well and urge you to call my office at your earliest convenience so that we can discuss your treatment and schedule any important appointments.     Sincerely,        DIXIE Enamorado CNP

## 2022-09-13 NOTE — PATIENT INSTRUCTIONS

## 2022-09-13 NOTE — PROGRESS NOTES
Base) MCG/ACT inhaler, TAKE 2 PUFFS BY INHALER UP TO 4 TIMES A DAY IF NEEDED FOR QUICK RELIEF ONLY (MAY TAKE 1 TO 2 PUFFS BEFORE EXERCISE) (Patient not taking: Reported on 9/13/2022), Disp: , Rfl:       Review of Systems        Objective:   PHYSICAL EXAM:  There were no vitals taken for this visit. Physical Exam  Exam:  Gen: No acute distress, does not appear to be in pain. Appears well developed and nourished. HENT: Head is normocephalic and atraumatic. Normal appearing nose. External Ears normal.   Neck: No visualized mass. Trachea is midline   Eyes: EOM intact. No visible discharge. Resp:No visualized signs of difficulty breathing or respiratory distress, speaking in full sentences. Respiratory effort normal.  Neuro: Awake. Alert. Able to follow commands. No facial asymmetry. Skin: No significant lesions or discoloration noted on facial skin    Musculoskeletal: Normal range of motion of the neck. Psych: Oriented x 3. No anxiety. Normal affect. DATA:   6/28/2018 TSH 2.13  2/26/2022 Hgb 13.7  6/2/2022 HST AHI 12.6, low SPO2 81%, under 89% 56.5 minutes    CPAP download data:      Assessment:       Mild PAULA. Snoring  Observed sleep apnea   Fatigue  Sleep maintenance insomnia-psychophysiological hyperarousal, poor sleep hygiene, PAULA likely contributing  Obesity  ADHD        Plan:      Treatment options were discussed with patient for PAULA, including CPAP/APAP therapy, oral appliances and upper airway surgery. Patient is in favor of trial of auto CPAP. Consider ONPO on CPAP when AHI is controlled  Advised to use CPAP 6-8 hrs at night and during naps. Replacement of mask, tubing, head straps every 3-6 months or sooner if damaged. Patient instructed to contact [employer] [facility] for any mask, tubing or machine trouble shooting if problems arise. Recommend set bed/wake times, no naps in the daytime  Avoidsedatives, alcohol and caffeinated drinks at bed time.   No driving motorized vehicles or operating heavy machinery while fatigue, drowsy or sleepy. Weight loss is also recommended as a long-term intervention. Complications of PAULA if not treated were discussed with patient patient to include systemic hypertension, pulmonary hypertension, cardiovascular morbidities, car accidents and all cause mortality. Discussed pathophysiology of PAULA with patient today. Patient education handout provided regarding sleep tips   Delmy Perkins, was evaluated through a synchronous (real-time) audio-video encounter. The patient (or guardian if applicable) is aware that this is a billable service, which includes applicable co-pays. This Virtual Visit was conducted with patient's (and/or legal guardian's) consent. The visit was conducted pursuant to the emergency declaration under the [facility], [address] [place] [address] authority and the [employer] General Act. Patient identification was verified, and a caregiver was present when appropriate. The patient was located at Home: [address] [place] [address],[address] [facility]. Provider was located at Home ([address]: [place]. Total time spent for this encounter: Not billed by time    --DIXIE Avery CNP on 9/13/2022 at 9:24 AM    An electronic signature was used to authenticate this note.

## 2022-10-03 NOTE — TELEPHONE ENCOUNTER
Please send letter as patient has not followed up as recommended after testing.   Please also make referring provider aware and forward sleep study results